# Patient Record
Sex: MALE | Race: WHITE | URBAN - METROPOLITAN AREA
[De-identification: names, ages, dates, MRNs, and addresses within clinical notes are randomized per-mention and may not be internally consistent; named-entity substitution may affect disease eponyms.]

---

## 2020-08-26 ENCOUNTER — NEW PATIENT (OUTPATIENT)
Dept: URBAN - METROPOLITAN AREA CLINIC 51 | Facility: CLINIC | Age: 70
End: 2020-08-26
Payer: MEDICARE

## 2020-08-26 DIAGNOSIS — I10 ESSENTIAL (PRIMARY) HYPERTENSION: ICD-10-CM

## 2020-08-26 PROCEDURE — 92004 COMPRE OPH EXAM NEW PT 1/>: CPT | Performed by: OPTOMETRIST

## 2020-08-26 PROCEDURE — 92134 CPTRZ OPH DX IMG PST SGM RTA: CPT | Performed by: OPTOMETRIST

## 2020-08-26 PROCEDURE — 92082 INTERMEDIATE VISUAL FIELD XM: CPT | Performed by: OPTOMETRIST

## 2020-08-26 ASSESSMENT — KERATOMETRY
OD: 45.61
OS: 45.37

## 2020-08-26 ASSESSMENT — VISUAL ACUITY
OS: 20/100
OD: 20/25

## 2020-08-26 ASSESSMENT — INTRAOCULAR PRESSURE
OS: 11
OD: 11

## 2020-08-31 ENCOUNTER — NEW PATIENT (OUTPATIENT)
Dept: URBAN - METROPOLITAN AREA CLINIC 56 | Facility: CLINIC | Age: 70
End: 2020-08-31
Payer: MEDICARE

## 2020-08-31 DIAGNOSIS — H34.8320 BRANCH RETINAL VEIN OCCLUSION W/ MACULAR EDEMA, LEFT EYE: Primary | ICD-10-CM

## 2020-08-31 PROCEDURE — 92004 COMPRE OPH EXAM NEW PT 1/>: CPT | Performed by: OPHTHALMOLOGY

## 2020-08-31 PROCEDURE — 92134 CPTRZ OPH DX IMG PST SGM RTA: CPT | Performed by: OPHTHALMOLOGY

## 2020-08-31 PROCEDURE — 67028 INJECTION EYE DRUG: CPT | Performed by: OPHTHALMOLOGY

## 2020-08-31 PROCEDURE — 92235 FLUORESCEIN ANGRPH MLTIFRAME: CPT | Performed by: OPHTHALMOLOGY

## 2020-08-31 ASSESSMENT — INTRAOCULAR PRESSURE
OS: 18
OD: 17

## 2020-09-09 ENCOUNTER — FOLLOW UP ESTABLISHED (OUTPATIENT)
Dept: URBAN - METROPOLITAN AREA CLINIC 51 | Facility: CLINIC | Age: 70
End: 2020-09-09
Payer: MEDICARE

## 2020-09-09 PROCEDURE — 92134 CPTRZ OPH DX IMG PST SGM RTA: CPT | Performed by: OPHTHALMOLOGY

## 2020-09-09 PROCEDURE — 67210 TREATMENT OF RETINAL LESION: CPT | Performed by: OPHTHALMOLOGY

## 2020-09-09 ASSESSMENT — INTRAOCULAR PRESSURE
OD: 9
OS: 11

## 2020-10-19 ENCOUNTER — FOLLOW UP ESTABLISHED (OUTPATIENT)
Dept: URBAN - METROPOLITAN AREA CLINIC 51 | Facility: CLINIC | Age: 70
End: 2020-10-19
Payer: MEDICARE

## 2020-10-19 PROCEDURE — 92014 COMPRE OPH EXAM EST PT 1/>: CPT | Performed by: OPHTHALMOLOGY

## 2020-10-19 PROCEDURE — 99024 POSTOP FOLLOW-UP VISIT: CPT | Performed by: OPHTHALMOLOGY

## 2020-10-19 PROCEDURE — 92134 CPTRZ OPH DX IMG PST SGM RTA: CPT | Performed by: OPHTHALMOLOGY

## 2020-10-19 PROCEDURE — 67028 INJECTION EYE DRUG: CPT | Performed by: OPHTHALMOLOGY

## 2020-10-19 ASSESSMENT — INTRAOCULAR PRESSURE
OD: 10
OS: 11

## 2020-12-14 ENCOUNTER — FOLLOW UP ESTABLISHED (OUTPATIENT)
Dept: URBAN - METROPOLITAN AREA CLINIC 51 | Facility: CLINIC | Age: 70
End: 2020-12-14
Payer: MEDICARE

## 2020-12-14 DIAGNOSIS — H25.13 AGE-RELATED NUCLEAR CATARACT, BILATERAL: ICD-10-CM

## 2020-12-14 PROCEDURE — 99214 OFFICE O/P EST MOD 30 MIN: CPT | Performed by: OPHTHALMOLOGY

## 2020-12-14 PROCEDURE — 92134 CPTRZ OPH DX IMG PST SGM RTA: CPT | Performed by: OPHTHALMOLOGY

## 2020-12-14 ASSESSMENT — INTRAOCULAR PRESSURE
OD: 15
OS: 14

## 2021-02-08 ENCOUNTER — FOLLOW UP ESTABLISHED (OUTPATIENT)
Dept: URBAN - METROPOLITAN AREA CLINIC 51 | Facility: CLINIC | Age: 71
End: 2021-02-08
Payer: MEDICARE

## 2021-02-08 PROCEDURE — 99214 OFFICE O/P EST MOD 30 MIN: CPT | Performed by: OPHTHALMOLOGY

## 2021-02-08 PROCEDURE — 92134 CPTRZ OPH DX IMG PST SGM RTA: CPT | Performed by: OPHTHALMOLOGY

## 2021-02-08 PROCEDURE — 92235 FLUORESCEIN ANGRPH MLTIFRAME: CPT | Performed by: OPHTHALMOLOGY

## 2021-02-08 ASSESSMENT — INTRAOCULAR PRESSURE
OD: 15
OS: 15

## 2021-06-28 ENCOUNTER — OFFICE VISIT (OUTPATIENT)
Dept: URBAN - METROPOLITAN AREA CLINIC 51 | Facility: CLINIC | Age: 71
End: 2021-06-28
Payer: MEDICARE

## 2021-06-28 PROCEDURE — 92134 CPTRZ OPH DX IMG PST SGM RTA: CPT | Performed by: OPHTHALMOLOGY

## 2021-06-28 PROCEDURE — 99214 OFFICE O/P EST MOD 30 MIN: CPT | Performed by: OPHTHALMOLOGY

## 2021-06-28 ASSESSMENT — INTRAOCULAR PRESSURE
OS: 11
OD: 9

## 2021-06-28 NOTE — IMPRESSION/PLAN
Impression: Branch Retinal Vein Occlusion, OS.
s/p Eylea 10/19/20 
s/p Avastin 08/31/20 Plan: Exam, OCT and FA reveal a sup BRVO. Continue Eylea, as necessary.

## 2021-07-20 ENCOUNTER — OFFICE VISIT (OUTPATIENT)
Dept: URBAN - METROPOLITAN AREA CLINIC 44 | Facility: CLINIC | Age: 71
End: 2021-07-20
Payer: MEDICARE

## 2021-07-20 PROCEDURE — 99214 OFFICE O/P EST MOD 30 MIN: CPT | Performed by: OPHTHALMOLOGY

## 2021-07-20 PROCEDURE — 92134 CPTRZ OPH DX IMG PST SGM RTA: CPT | Performed by: OPHTHALMOLOGY

## 2021-07-20 ASSESSMENT — INTRAOCULAR PRESSURE
OD: 12
OS: 12

## 2021-07-20 NOTE — IMPRESSION/PLAN
Impression: Branch Retinal Vein Occlusion, OS.
s/p Eylea 10/19/20 
s/p Avastin 08/31/20 Plan: Exam, OCT and FA reveal a sup BRVO. Continue Eylea, as necessary. Follow up: 6 Month Reeval, Dil OU, OCT, RNFL, FA OS>OD. as scheduled with Dr. Deon Pickard.

## 2021-08-17 ENCOUNTER — OFFICE VISIT (OUTPATIENT)
Dept: URBAN - METROPOLITAN AREA CLINIC 51 | Facility: CLINIC | Age: 71
End: 2021-08-17
Payer: MEDICARE

## 2021-08-17 DIAGNOSIS — H25.813 COMBINED FORMS OF AGE-RELATED CATARACT, BILATERAL: Primary | ICD-10-CM

## 2021-08-17 PROCEDURE — 99214 OFFICE O/P EST MOD 30 MIN: CPT | Performed by: OPTOMETRIST

## 2021-08-17 ASSESSMENT — INTRAOCULAR PRESSURE
OD: 16
OS: 16

## 2021-08-17 ASSESSMENT — VISUAL ACUITY
OD: 20/20
OS: 20/40

## 2021-08-17 ASSESSMENT — KERATOMETRY
OS: 45.34
OD: 45.43

## 2021-08-17 NOTE — IMPRESSION/PLAN
Impression: Combined forms of age-related cataract, bilateral: H25.813. Plan: Discussed cataracts with patient. Discussed treatment options. Surgical treatment is recommended. Surgical risks and benefits discussed. Patient elects surgical treatment. Recommend surgery OU, OS first.  
Patient is candidate/interested in toric, standard, LenSx and ORA. Aim OD: -2.00 Aim OS: -2.00 Patient understands that he will need glasses for all near work/reading after surgery, and may need full time glasses for best possible vision after cataract surgery. Patient was cleared by Dr. Leah Zapien for cataract surgery on 07/20/2021.

## 2021-08-17 NOTE — IMPRESSION/PLAN
Impression: Branch Retinal Vein Occlusion, OS.
  *s/p Eylea 10/19/20 *s/p Avastin 08/31/20 Plan: Stable superior BRVO monitored by retina. Recommend continued follow up care with Dr. Radha Mallory for further evaluation. Pt aware blind spot in vision is permanent, vision will be limited post op. Patient instructed to contact our office if notice any changes in vision.

## 2021-08-17 NOTE — IMPRESSION/PLAN
Impression: Vitreous membranes and strands, bilateral: H43.313. Plan: Educated patient on process of vitreous syneresis. Retina is attached 360 degrees with no signs of any retinal holes, tears, or detachments observed on today's dilated examination. Pt to RTC ASAP if increase in floaters, flashes, or curtain or veil taking away vision.

## 2022-03-25 ENCOUNTER — OFFICE VISIT (OUTPATIENT)
Dept: URBAN - METROPOLITAN AREA CLINIC 51 | Facility: CLINIC | Age: 72
End: 2022-03-25
Payer: MEDICARE

## 2022-03-25 DIAGNOSIS — H43.313 VITREOUS MEMBRANES AND STRANDS, BILATERAL: ICD-10-CM

## 2022-03-25 DIAGNOSIS — D48.1 NEOPLASM OF UNCERTIAN BEHAVIOR OF EYELID: ICD-10-CM

## 2022-03-25 PROCEDURE — 99214 OFFICE O/P EST MOD 30 MIN: CPT | Performed by: OPTOMETRIST

## 2022-03-25 PROCEDURE — 92134 CPTRZ OPH DX IMG PST SGM RTA: CPT | Performed by: OPTOMETRIST

## 2022-03-25 ASSESSMENT — VISUAL ACUITY
OS: 20/40
OD: 20/25

## 2022-03-25 ASSESSMENT — KERATOMETRY
OS: 45.30
OD: 45.46

## 2022-06-23 ENCOUNTER — TESTING ONLY (OUTPATIENT)
Dept: URBAN - METROPOLITAN AREA CLINIC 51 | Facility: CLINIC | Age: 72
End: 2022-06-23
Payer: MEDICARE

## 2022-06-23 DIAGNOSIS — Z01.818 ENCOUNTER FOR OTHER PREPROCEDURAL EXAMINATION: Primary | ICD-10-CM

## 2022-06-23 DIAGNOSIS — H25.813 COMBINED FORMS OF AGE-RELATED CATARACT, BILATERAL: ICD-10-CM

## 2022-06-23 PROCEDURE — 99203 OFFICE O/P NEW LOW 30 MIN: CPT | Performed by: PHYSICIAN ASSISTANT

## 2022-06-23 PROCEDURE — 92250 FUNDUS PHOTOGRAPHY W/I&R: CPT | Performed by: OPHTHALMOLOGY

## 2022-06-23 RX ORDER — AMLODIPINE BESYLATE 10 MG/1
10 MG TABLET ORAL
Qty: 0 | Refills: 0 | Status: ACTIVE
Start: 2022-06-23

## 2022-06-23 RX ORDER — LOSARTAN POTASSIUM 100 MG/1
100 MG TABLET, FILM COATED ORAL
Qty: 0 | Refills: 0 | Status: ACTIVE
Start: 2022-06-23

## 2022-06-23 RX ORDER — LOVASTATIN 40 MG/1
40 MG TABLET ORAL
Qty: 0 | Refills: 0 | Status: ACTIVE
Start: 2022-06-23

## 2022-07-01 ENCOUNTER — OFFICE VISIT (OUTPATIENT)
Dept: URBAN - METROPOLITAN AREA CLINIC 51 | Facility: CLINIC | Age: 72
End: 2022-07-01
Payer: MEDICARE

## 2022-07-01 DIAGNOSIS — H25.813 COMBINED FORMS OF AGE-RELATED CATARACT, BILATERAL: Primary | ICD-10-CM

## 2022-07-01 DIAGNOSIS — H04.123 DRY EYE SYNDROME OF BILATERAL LACRIMAL GLANDS: ICD-10-CM

## 2022-07-01 DIAGNOSIS — H25.13 AGE-RELATED NUCLEAR CATARACT, BILATERAL: ICD-10-CM

## 2022-07-01 DIAGNOSIS — D48.1 NEOPLASM OF UNCERTIAN BEHAVIOR OF EYELID: ICD-10-CM

## 2022-07-01 DIAGNOSIS — H52.203 BILATERAL ASTIGMATISM: ICD-10-CM

## 2022-07-01 DIAGNOSIS — H34.8320 TRIBUTARY (BRANCH) RETINAL VEIN OCCLUSION, LEFT EYE, WITH MACULAR EDEMA: ICD-10-CM

## 2022-07-01 DIAGNOSIS — H43.313 VITREOUS MEMBRANES AND STRANDS, BILATERAL: ICD-10-CM

## 2022-07-01 PROCEDURE — 99214 OFFICE O/P EST MOD 30 MIN: CPT | Performed by: OPHTHALMOLOGY

## 2022-07-01 PROCEDURE — 92082 INTERMEDIATE VISUAL FIELD XM: CPT | Performed by: OPHTHALMOLOGY

## 2022-07-01 ASSESSMENT — VISUAL ACUITY
OD: 20/25
OS: 20/40

## 2022-07-01 NOTE — IMPRESSION/PLAN
Impression: Neoplasm of uncertian behavior of eyelid: D48.1. Plan: MIKAYLA cyst. Longstanding. Monitor. Discussed with patient, understands this may limit vision after surgery.

## 2022-07-01 NOTE — IMPRESSION/PLAN
Impression: Branch Retinal Vein Occlusion, left eye *s/p Eylea 10/19/20  ;    *s/p Avastin 08/31/20 Plan: Stable superior BRVO. Discussed with patient, understands this may limit vision after surgery. TESTS: 
7/1/22 OD: Roxana Iron; OS: Fair-full Discussed signs and symptoms of retinal detachment (flashes,floaters, curtain) as precaution. Patient instructed to call or return to clinic if condition gets worse. English

## 2022-07-07 ENCOUNTER — SURGERY (OUTPATIENT)
Dept: URBAN - METROPOLITAN AREA SURGERY 19 | Facility: SURGERY | Age: 72
End: 2022-07-07
Payer: MEDICARE

## 2022-07-07 DIAGNOSIS — H25.813 COMBINED FORMS OF AGE-RELATED CATARACT, BILATERAL: Primary | ICD-10-CM

## 2022-07-07 PROCEDURE — 66984 XCAPSL CTRC RMVL W/O ECP: CPT | Performed by: OPHTHALMOLOGY

## 2022-07-07 PROCEDURE — PR1CP PR1CP: CUSTOM | Performed by: OPHTHALMOLOGY

## 2022-07-08 ENCOUNTER — POST-OPERATIVE VISIT (OUTPATIENT)
Dept: URBAN - METROPOLITAN AREA CLINIC 44 | Facility: CLINIC | Age: 72
End: 2022-07-08
Payer: MEDICARE

## 2022-07-08 DIAGNOSIS — Z48.810 ENCOUNTER FOR SURGICAL AFTERCARE FOLLOWING SURGERY ON A SENSE ORGAN: Primary | ICD-10-CM

## 2022-07-08 PROCEDURE — 99024 POSTOP FOLLOW-UP VISIT: CPT | Performed by: OPTOMETRIST

## 2022-07-08 ASSESSMENT — INTRAOCULAR PRESSURE: OS: 18

## 2022-07-08 NOTE — IMPRESSION/PLAN
Impression: S/P Cataract Extraction by phacoemulsification with IOL placement; ORA OS - 1 Day. Encounter for surgical aftercare following surgery on a sense organ  Z48.670. Plan: Reviewed findings with patient. Continue with prescribed medications as directed. RTC 1 week w Dr Jc Michaud for Refraction + IOP check.  . RTC sooner if any decreased vision or pain occurs

## 2022-07-14 ENCOUNTER — POST-OPERATIVE VISIT (OUTPATIENT)
Dept: URBAN - METROPOLITAN AREA CLINIC 51 | Facility: CLINIC | Age: 72
End: 2022-07-14
Payer: MEDICARE

## 2022-07-14 DIAGNOSIS — Z48.810 ENCOUNTER FOR SURGICAL AFTERCARE FOLLOWING SURGERY ON A SENSE ORGAN: Primary | ICD-10-CM

## 2022-07-14 PROCEDURE — 99024 POSTOP FOLLOW-UP VISIT: CPT | Performed by: OPTOMETRIST

## 2022-07-14 ASSESSMENT — VISUAL ACUITY
OD: 20/25
OS: 20/20

## 2022-07-14 ASSESSMENT — INTRAOCULAR PRESSURE
OS: 11
OD: 12

## 2022-07-14 NOTE — IMPRESSION/PLAN
Impression: S/P Cataract Extraction by phacoemulsification with IOL placement; ORA OS - 7 Days. Encounter for surgical aftercare following surgery on a sense organ  Z48.810. Plan: Doing well 1WK PO. Swelling and inflammation has resolved. Retina is attached 360 degrees on today's dilated examination. Patient to contact our office ASAP if notice signs/symptoms of RD. Restrictions no longer apply until after next surgery. Continue AT's at least 2-3 times per day or more OU. Okay to proceed with second eye, patient prefers. RTC as scheduled for cataract surgery OD.

## 2022-07-21 ENCOUNTER — SURGERY (OUTPATIENT)
Dept: URBAN - METROPOLITAN AREA SURGERY 19 | Facility: SURGERY | Age: 72
End: 2022-07-21
Payer: MEDICARE

## 2022-07-21 DIAGNOSIS — H25.11 AGE-RELATED NUCLEAR CATARACT, RIGHT EYE: Primary | ICD-10-CM

## 2022-07-21 DIAGNOSIS — H52.203 UNSPECIFIED ASTIGMATISM, BILATERAL: ICD-10-CM

## 2022-07-21 PROCEDURE — PR1CP PR1CP: CUSTOM | Performed by: OPHTHALMOLOGY

## 2022-07-21 PROCEDURE — 66984 XCAPSL CTRC RMVL W/O ECP: CPT | Performed by: OPHTHALMOLOGY

## 2022-07-22 ENCOUNTER — POST-OPERATIVE VISIT (OUTPATIENT)
Dept: URBAN - METROPOLITAN AREA CLINIC 51 | Facility: CLINIC | Age: 72
End: 2022-07-22
Payer: MEDICARE

## 2022-07-22 DIAGNOSIS — Z96.1 PRESENCE OF INTRAOCULAR LENS: Primary | ICD-10-CM

## 2022-07-22 PROCEDURE — 99024 POSTOP FOLLOW-UP VISIT: CPT | Performed by: OPTOMETRIST

## 2022-07-22 ASSESSMENT — INTRAOCULAR PRESSURE: OD: 16

## 2022-07-22 NOTE — IMPRESSION/PLAN
Impression: S/P CE/Standard IOL OD - 1 Day. Presence of intraocular lens  Z96.1. Plan: Doing well POD1. Swelling and inflammation noted on today's examination. Discussed with patient that vision will continue to improve as swelling and inflammation resolves. Assured patient visual symptoms are normal and will resolve with time. Restrictions apply for 1 week. Recommend ATs when eyes feel dry/gritty, itchy, or water. Can use OTC reading glasses. RTC as scheduled for final post op appointment/SRx.

## 2022-08-18 ENCOUNTER — POST-OPERATIVE VISIT (OUTPATIENT)
Dept: URBAN - METROPOLITAN AREA CLINIC 51 | Facility: CLINIC | Age: 72
End: 2022-08-18
Payer: MEDICARE

## 2022-08-18 DIAGNOSIS — Z96.1 PRESENCE OF INTRAOCULAR LENS: Primary | ICD-10-CM

## 2022-08-18 PROCEDURE — 99024 POSTOP FOLLOW-UP VISIT: CPT | Performed by: OPTOMETRIST

## 2022-08-18 ASSESSMENT — VISUAL ACUITY
OS: 20/20
OD: 20/20

## 2022-08-18 NOTE — IMPRESSION/PLAN
Impression: S/P Cataract Extraction by phacoemulsification with IOL placement; ORA; Astigmatic Keratotomy OD - 28 Days. Presence of intraocular lens  Z96.1. Plan: Doing well. Swelling and inflammation has resolved. Clear and centered IOL's OU. Small subconjunctival hemorrhage OD, will resolve with time. Do not recommend use of Visine or Clear Eyes. Continue AT's as needed for comfort. Discussed the option of releasing SRx vs. OTC reading glasses. Patient declined today's SRx and wishes to continue with OTC reading glasses. RTC March 2023 for CE + MAC OCT.

## 2022-09-19 ENCOUNTER — OFFICE VISIT (OUTPATIENT)
Dept: URBAN - METROPOLITAN AREA CLINIC 51 | Facility: CLINIC | Age: 72
End: 2022-09-19
Payer: MEDICARE

## 2022-09-19 DIAGNOSIS — Z96.1 PRESENCE OF INTRAOCULAR LENS: ICD-10-CM

## 2022-09-19 DIAGNOSIS — H11.32 SUBCONJUNCTIVAL HEMORRHAGE OF LEFT EYE: ICD-10-CM

## 2022-09-19 DIAGNOSIS — H20.011 PRIMARY IRIDOCYCLITIS OF RIGHT EYE: Primary | ICD-10-CM

## 2022-09-19 PROCEDURE — 99024 POSTOP FOLLOW-UP VISIT: CPT | Performed by: OPTOMETRIST

## 2022-09-19 RX ORDER — PREDNISOLONE ACETATE 10 MG/ML
1 % SUSPENSION/ DROPS OPHTHALMIC
Qty: 10 | Refills: 1 | Status: INACTIVE
Start: 2022-09-19 | End: 2022-10-18

## 2022-09-19 ASSESSMENT — INTRAOCULAR PRESSURE
OD: 12
OS: 11

## 2022-09-19 NOTE — IMPRESSION/PLAN
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 *rebound iritis OS with concomitant subconj heme Plan: Reviewed findings with patient. Start Pred Forte QID OS. Sent to pharmacy Recheck in 1 week and taper

## 2022-09-28 ENCOUNTER — POST-OPERATIVE VISIT (OUTPATIENT)
Dept: URBAN - METROPOLITAN AREA CLINIC 51 | Facility: CLINIC | Age: 72
End: 2022-09-28
Payer: MEDICARE

## 2022-09-28 DIAGNOSIS — Z96.1 PRESENCE OF INTRAOCULAR LENS: Primary | ICD-10-CM

## 2022-09-28 PROCEDURE — 99024 POSTOP FOLLOW-UP VISIT: CPT | Performed by: OPTOMETRIST

## 2022-09-28 ASSESSMENT — INTRAOCULAR PRESSURE
OD: 10
OS: 12

## 2022-09-28 NOTE — IMPRESSION/PLAN
Impression: S/P Cataract Extraction by phacoemulsification with IOL placement; ORA; Astigmatic Keratotomy OD - 69 Days. Presence of intraocular lens  Z96.1. Plan: doing well OU today. happy with OTC readers. Primary iridocyclitis resolved OS Taper Pred Forte TID OS x 1 week, bid x 1 week, qd x 1 week then Stop.

## 2023-03-24 ENCOUNTER — OFFICE VISIT (OUTPATIENT)
Dept: URBAN - METROPOLITAN AREA CLINIC 51 | Facility: CLINIC | Age: 73
End: 2023-03-24
Payer: MEDICARE

## 2023-03-24 DIAGNOSIS — D48.1 NEOPLASM OF UNCERTIAN BEHAVIOR OF EYELID: ICD-10-CM

## 2023-03-24 DIAGNOSIS — H20.011 PRIMARY IRIDOCYCLITIS OF RIGHT EYE: ICD-10-CM

## 2023-03-24 DIAGNOSIS — H26.493 OTHER SECONDARY CATARACT, BILATERAL: Primary | ICD-10-CM

## 2023-03-24 DIAGNOSIS — H43.813 VITREOUS DEGENERATION, BILATERAL: ICD-10-CM

## 2023-03-24 DIAGNOSIS — H34.8320 TRIBUTARY (BRANCH) RETINAL VEIN OCCLUSION, LEFT EYE, WITH MACULAR EDEMA: ICD-10-CM

## 2023-03-24 PROCEDURE — 92134 CPTRZ OPH DX IMG PST SGM RTA: CPT | Performed by: OPTOMETRIST

## 2023-03-24 PROCEDURE — 92014 COMPRE OPH EXAM EST PT 1/>: CPT | Performed by: OPTOMETRIST

## 2023-03-24 ASSESSMENT — VISUAL ACUITY
OS: 20/20
OD: 20/20

## 2023-03-24 ASSESSMENT — KERATOMETRY
OS: 45.38
OD: 45.38

## 2023-03-24 ASSESSMENT — INTRAOCULAR PRESSURE
OS: 12
OD: 9

## 2023-03-24 NOTE — IMPRESSION/PLAN
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 *rebound iritis OS with concomitant subconj heme Plan: No round iritis noted today. RTC if symptoms recur.

## 2023-03-24 NOTE — IMPRESSION/PLAN
Impression: Branch Retinal Vein Occlusion, left eye *s/p Eylea 10/19/20  ;    *s/p Avastin 08/31/20 Plan: Old superior BRVO, no CME. MAC OCT taken today. Will continue to limit BCVA. Monitor.

## 2024-04-05 ENCOUNTER — OFFICE VISIT (OUTPATIENT)
Dept: URBAN - METROPOLITAN AREA CLINIC 51 | Facility: CLINIC | Age: 74
End: 2024-04-05
Payer: MEDICARE

## 2024-04-05 DIAGNOSIS — H34.8320 TRIBUTARY (BRANCH) RETINAL VEIN OCCLUSION, LEFT EYE, WITH MACULAR EDEMA: Primary | ICD-10-CM

## 2024-04-05 DIAGNOSIS — H20.011 PRIMARY IRIDOCYCLITIS OF RIGHT EYE: ICD-10-CM

## 2024-04-05 DIAGNOSIS — H26.493 OTHER SECONDARY CATARACT, BILATERAL: ICD-10-CM

## 2024-04-05 DIAGNOSIS — H43.813 VITREOUS DEGENERATION, BILATERAL: ICD-10-CM

## 2024-04-05 PROCEDURE — 92014 COMPRE OPH EXAM EST PT 1/>: CPT | Performed by: OPTOMETRIST

## 2024-04-05 PROCEDURE — 92134 CPTRZ OPH DX IMG PST SGM RTA: CPT | Performed by: OPTOMETRIST

## 2024-04-05 ASSESSMENT — VISUAL ACUITY
OS: 20/20
OD: 20/20

## 2024-04-05 ASSESSMENT — KERATOMETRY
OD: 43.50
OS: 45.50

## 2024-04-05 ASSESSMENT — INTRAOCULAR PRESSURE
OD: 14
OS: 15

## 2024-05-25 NOTE — IMPRESSION/PLAN
Impression: Branch Retinal Vein Occlusion, OS.
  *s/p Eylea 10/19/20 *s/p Avastin 08/31/20 Plan: Stable superior BRVO. Recommend continued follow up care with Dr. Yenny Byrnes for further evaluation. Patient aware blind spot in vision is permanent, vision will be limited after cataract surgery. Patient to contact our office if notice any changes in vision.
Impression: Combined forms of age-related cataract, bilateral: H25.813. Plan: Discussed cataracts with patient. Discussed treatment options. Surgical treatment is recommended. Surgical risks and benefits discussed. Patient elects surgical treatment. Recommend surgery OU, OS first.  
Patient is candidate/interested in toric, standard, LenSx and ORA.  
Aim OD: [place]   Aim OS: [place]
Impression: Neoplasm of uncertian behavior of eyelid: D48.1. Plan: MIKAYLA cyst. Longstanding. Monitor.
Impression: Vitreous membranes and strands, bilateral: H43.313. Plan: Retina is attached 360 degrees with no signs of any retinal holes, tears, or detachments observed on today's dilated examination. Patient to RTC ASAP if increase in floaters, flashes, or curtain or veil taking away vision.
Improved

## 2025-04-11 ENCOUNTER — OFFICE VISIT (OUTPATIENT)
Dept: URBAN - METROPOLITAN AREA CLINIC 51 | Facility: CLINIC | Age: 75
End: 2025-04-11
Payer: MEDICARE

## 2025-04-11 DIAGNOSIS — H26.491 OTHER SECONDARY CATARACT, RIGHT EYE: Primary | ICD-10-CM

## 2025-04-11 DIAGNOSIS — D48.5 NEOPLASM OF UNCERTIAN BEHAVIOR OF SKIN: ICD-10-CM

## 2025-04-11 DIAGNOSIS — H43.813 VITREOUS DEGENERATION, BILATERAL: ICD-10-CM

## 2025-04-11 DIAGNOSIS — H20.011 PRIMARY IRIDOCYCLITIS OF RIGHT EYE: ICD-10-CM

## 2025-04-11 DIAGNOSIS — H34.8320 TRIBUTARY (BRANCH) RETINAL VEIN OCCLUSION, LEFT EYE, WITH MACULAR EDEMA: ICD-10-CM

## 2025-04-11 DIAGNOSIS — Z96.1 PRESENCE OF INTRAOCULAR LENS: ICD-10-CM

## 2025-04-11 PROCEDURE — 92014 COMPRE OPH EXAM EST PT 1/>: CPT | Performed by: OPTOMETRIST

## 2025-04-11 PROCEDURE — 92134 CPTRZ OPH DX IMG PST SGM RTA: CPT | Performed by: OPTOMETRIST

## 2025-04-11 ASSESSMENT — VISUAL ACUITY
OD: 20/20
OS: 20/20

## 2025-04-11 ASSESSMENT — KERATOMETRY
OD: 45.63
OS: 45.88

## 2025-04-11 ASSESSMENT — INTRAOCULAR PRESSURE
OD: 16
OS: 16